# Patient Record
Sex: FEMALE | Race: WHITE | NOT HISPANIC OR LATINO | Employment: FULL TIME | URBAN - METROPOLITAN AREA
[De-identification: names, ages, dates, MRNs, and addresses within clinical notes are randomized per-mention and may not be internally consistent; named-entity substitution may affect disease eponyms.]

---

## 2017-05-08 ENCOUNTER — TRANSFERRED RECORDS (OUTPATIENT)
Dept: FAMILY MEDICINE | Facility: CLINIC | Age: 27
End: 2017-05-08

## 2017-10-29 ENCOUNTER — HEALTH MAINTENANCE LETTER (OUTPATIENT)
Age: 27
End: 2017-10-29

## 2019-07-07 DIAGNOSIS — J45.20 MILD INTERMITTENT REACTIVE AIRWAY DISEASE WITHOUT COMPLICATION: Primary | ICD-10-CM

## 2019-07-07 RX ORDER — ALBUTEROL SULFATE 90 UG/1
2 AEROSOL, METERED RESPIRATORY (INHALATION) EVERY 6 HOURS
Qty: 3 INHALER | Refills: 3 | Status: SHIPPED | OUTPATIENT
Start: 2019-07-07 | End: 2020-08-11

## 2020-05-12 DIAGNOSIS — J45.20 MILD INTERMITTENT REACTIVE AIRWAY DISEASE WITHOUT COMPLICATION: ICD-10-CM

## 2020-08-11 DIAGNOSIS — J45.20 MILD INTERMITTENT REACTIVE AIRWAY DISEASE WITHOUT COMPLICATION: ICD-10-CM

## 2020-08-11 RX ORDER — ALBUTEROL SULFATE 90 UG/1
2 AEROSOL, METERED RESPIRATORY (INHALATION) EVERY 6 HOURS
Qty: 3 INHALER | Refills: 3 | Status: SHIPPED | OUTPATIENT
Start: 2020-08-11 | End: 2024-02-05

## 2022-07-12 ENCOUNTER — HOSPITAL ENCOUNTER (OUTPATIENT)
Facility: CLINIC | Age: 32
End: 2022-07-12
Payer: COMMERCIAL

## 2022-08-18 ENCOUNTER — LAB (OUTPATIENT)
Dept: LAB | Facility: CLINIC | Age: 32
End: 2022-08-18
Payer: COMMERCIAL

## 2022-08-18 DIAGNOSIS — Z20.822 ENCOUNTER FOR LABORATORY TESTING FOR COVID-19 VIRUS: ICD-10-CM

## 2022-08-18 LAB — SARS-COV-2 RNA RESP QL NAA+PROBE: NEGATIVE

## 2022-08-18 PROCEDURE — U0003 INFECTIOUS AGENT DETECTION BY NUCLEIC ACID (DNA OR RNA); SEVERE ACUTE RESPIRATORY SYNDROME CORONAVIRUS 2 (SARS-COV-2) (CORONAVIRUS DISEASE [COVID-19]), AMPLIFIED PROBE TECHNIQUE, MAKING USE OF HIGH THROUGHPUT TECHNOLOGIES AS DESCRIBED BY CMS-2020-01-R: HCPCS

## 2022-08-18 PROCEDURE — U0005 INFEC AGEN DETEC AMPLI PROBE: HCPCS

## 2022-09-28 ENCOUNTER — OFFICE VISIT (OUTPATIENT)
Dept: FAMILY MEDICINE | Facility: CLINIC | Age: 32
End: 2022-09-28

## 2022-09-28 VITALS
TEMPERATURE: 97.8 F | DIASTOLIC BLOOD PRESSURE: 72 MMHG | WEIGHT: 122.6 LBS | HEIGHT: 61 IN | BODY MASS INDEX: 23.15 KG/M2 | RESPIRATION RATE: 16 BRPM | HEART RATE: 98 BPM | SYSTOLIC BLOOD PRESSURE: 130 MMHG | OXYGEN SATURATION: 97 %

## 2022-09-28 DIAGNOSIS — J45.20 MILD INTERMITTENT ASTHMA WITHOUT COMPLICATION: ICD-10-CM

## 2022-09-28 DIAGNOSIS — R21 RASH AND NONSPECIFIC SKIN ERUPTION: Primary | ICD-10-CM

## 2022-09-28 PROCEDURE — 99213 OFFICE O/P EST LOW 20 MIN: CPT | Performed by: FAMILY MEDICINE

## 2022-09-28 RX ORDER — IBUPROFEN 200 MG
600 TABLET ORAL
COMMUNITY
Start: 2022-08-22

## 2022-09-28 RX ORDER — SENNOSIDES A AND B 8.6 MG/1
8.6-17.2 TABLET, FILM COATED ORAL
COMMUNITY
Start: 2022-08-22 | End: 2022-09-28

## 2022-09-28 RX ORDER — OXYCODONE HYDROCHLORIDE 5 MG/1
5-10 TABLET ORAL
COMMUNITY
Start: 2022-08-22 | End: 2022-09-28

## 2022-09-28 RX ORDER — POLYETHYLENE GLYCOL 3350 17 G/17G
17 POWDER, FOR SOLUTION ORAL
COMMUNITY
Start: 2022-08-22 | End: 2022-09-28

## 2022-09-28 RX ORDER — FERROUS GLUCONATE 324(38)MG
324 TABLET ORAL
COMMUNITY
Start: 2022-08-22 | End: 2022-09-28

## 2022-09-28 RX ORDER — PREDNISONE 20 MG/1
40 TABLET ORAL DAILY
Qty: 8 TABLET | Refills: 0 | Status: SHIPPED | OUTPATIENT
Start: 2022-09-28 | End: 2022-10-02

## 2022-09-28 RX ORDER — PREDNISONE 20 MG/1
40 TABLET ORAL DAILY
Qty: 8 TABLET | Refills: 0 | Status: SHIPPED | OUTPATIENT
Start: 2022-09-28 | End: 2022-09-28

## 2022-09-28 RX ORDER — ACETAMINOPHEN 325 MG/1
325-650 TABLET ORAL
COMMUNITY
Start: 2022-08-22

## 2022-09-28 NOTE — PROGRESS NOTES
"SUBJECTIVE:    Deepa Osuna, is a 32 year old female, 5 weeks post partum with h/o asthma presenting for the below:     1. 2 day Hx of intensely pruritic rash. Initially to flexural surface of hands bilaterally. Spread to involve arms, legs and torso over the next 24 hours. Rash spares face and is present, but less florid to palms and soles. No lesions in mouth or lips. Mild cough last week requiring increased use of albuterol inhaler.  5 week old infant with mild cough also (currently breast feeding). Otherwise all in household are well.     Denies associated lip swelling, nausea, fevers, sore throat, wheeze and feels constitutionally well otherwise. Applying 1% hydrocortisone with minimal effect.     Denies any rash during pregnancy. No recent new cosmetic products. Is using new washing detergent, but formulated for sensitive skin. No new medications, supplements.      OBJECTIVE:  Vitals:    09/28/22 1530   BP: 130/72   Pulse: 98   Resp: 16   Temp: 97.8  F (36.6  C)   SpO2: 97%   Weight: 55.6 kg (122 lb 9.6 oz)   Height: 1.549 m (5' 1\")    Body mass index is 23.17 kg/m .    General: no acute distress, cooperative with exam.  Lungs: clear to auscultation bilaterally, normal respiratory effort.  Heart: regular rate, normal S1 and S2, no murmurs.   Skin: florid blanching maculopapular rash to all 4 limbs, flexural and extensor surfaces. Sandpaper like texture. Not affecting web spaces. Not centralized in stria or around waist. Not affecting lips or buccal mucosa. No vesicular lesions. Not confined to sun exposed areas.                  ASSESSMENT / PLAN:        Rash and nonspecific skin eruption  Mild intermittent asthma without complication  5 week post partum lactating mother with 2 days history of sudden onset intensely itchy maculopapular florid rash with sandpaper like quality. Differential includes eczema / atopic dermatitis eruption vs contact dermatitis. Less likely viral exanthem. Due to duration from " delivery, unlikely to be pregnancy related rash.   - short course of prednisone (evidence of this occurring in breast milk : she will give EBM over this time from a stored supply she has created)  -application of benadryl cream  -discussed antihistamines can cause reduced milk supply.     Issued on My Chart:    -if you are not getting any relief with the oral prednisone and benadryl cream we could add in a steroid ointment to apply to the skin in the most irritated areas: let me know in a few days how things are going.      -the dermatologist also advised daily bathing with a vanicream bar soap and luke warm water, pat dry after bathing and apply a non scented moisturizing cream (Eucerin is a good make).      -if this is not improving in 3-4 days, we should consider doing a skin biospy (taking a small pinch of skin for the pathologist to look at under the microscope to help focus down what is causing it).      -     predniSONE (DELTASONE) 20 MG tablet; Take 2 tablets (40 mg) by mouth daily for 4 days

## 2023-01-14 ENCOUNTER — HEALTH MAINTENANCE LETTER (OUTPATIENT)
Age: 33
End: 2023-01-14

## 2023-02-05 DIAGNOSIS — J45.20 MILD INTERMITTENT ASTHMA WITHOUT COMPLICATION: Primary | ICD-10-CM

## 2023-02-05 RX ORDER — FLUTICASONE PROPIONATE AND SALMETEROL 113; 14 UG/1; UG/1
1 POWDER, METERED RESPIRATORY (INHALATION) 2 TIMES DAILY
Qty: 3 EACH | Refills: 3 | Status: SHIPPED | OUTPATIENT
Start: 2023-02-05 | End: 2023-02-05

## 2023-02-05 RX ORDER — FLUTICASONE PROPIONATE AND SALMETEROL 113; 14 UG/1; UG/1
POWDER, METERED RESPIRATORY (INHALATION)
Qty: 3 EACH | Refills: 3 | Status: SHIPPED | OUTPATIENT
Start: 2023-02-05

## 2023-02-05 RX ORDER — FLUTICASONE PROPIONATE AND SALMETEROL 113; 14 UG/1; UG/1
1 POWDER, METERED RESPIRATORY (INHALATION) 2 TIMES DAILY
Qty: 3 EACH | Refills: 3 | Status: SHIPPED | OUTPATIENT
Start: 2023-02-05

## 2024-02-05 DIAGNOSIS — J45.20 MILD INTERMITTENT REACTIVE AIRWAY DISEASE WITHOUT COMPLICATION: ICD-10-CM

## 2024-02-05 RX ORDER — ALBUTEROL SULFATE 90 UG/1
2 AEROSOL, METERED RESPIRATORY (INHALATION) EVERY 6 HOURS
Qty: 18 G | Refills: 3 | Status: SHIPPED | OUTPATIENT
Start: 2024-02-05

## 2024-02-17 ENCOUNTER — HEALTH MAINTENANCE LETTER (OUTPATIENT)
Age: 34
End: 2024-02-17

## 2024-02-19 DIAGNOSIS — H10.31 ACUTE BACTERIAL CONJUNCTIVITIS OF RIGHT EYE: Primary | ICD-10-CM

## 2024-02-19 RX ORDER — OFLOXACIN 3 MG/ML
1-2 SOLUTION/ DROPS OPHTHALMIC 4 TIMES DAILY
Qty: 5 ML | Refills: 1 | Status: SHIPPED | OUTPATIENT
Start: 2024-02-19

## 2024-02-25 DIAGNOSIS — J45.20 MILD INTERMITTENT ASTHMA WITHOUT COMPLICATION: Primary | ICD-10-CM

## 2024-02-25 RX ORDER — FLUTICASONE PROPIONATE AND SALMETEROL 113; 14 UG/1; UG/1
1 POWDER, METERED RESPIRATORY (INHALATION) 2 TIMES DAILY
Qty: 3 EACH | Refills: 3 | Status: SHIPPED | OUTPATIENT
Start: 2024-02-25

## 2024-09-24 LAB — GROUP B STREPTOCOCCUS (EXTERNAL): NEGATIVE

## 2024-10-14 ENCOUNTER — LAB (OUTPATIENT)
Dept: LAB | Facility: CLINIC | Age: 34
End: 2024-10-14
Payer: COMMERCIAL

## 2024-10-14 LAB
ABO/RH(D): NORMAL
ANTIBODY SCREEN: NEGATIVE
HGB BLD-MCNC: 11.1 G/DL (ref 11.7–15.7)
SPECIMEN EXPIRATION DATE: NORMAL
T PALLIDUM AB SER QL: NONREACTIVE

## 2024-10-14 PROCEDURE — 86780 TREPONEMA PALLIDUM: CPT | Performed by: OBSTETRICS & GYNECOLOGY

## 2024-10-14 PROCEDURE — 36415 COLL VENOUS BLD VENIPUNCTURE: CPT | Performed by: OBSTETRICS & GYNECOLOGY

## 2024-10-14 PROCEDURE — 86901 BLOOD TYPING SEROLOGIC RH(D): CPT | Performed by: OBSTETRICS & GYNECOLOGY

## 2024-10-14 PROCEDURE — 86900 BLOOD TYPING SEROLOGIC ABO: CPT | Performed by: OBSTETRICS & GYNECOLOGY

## 2024-10-14 PROCEDURE — 85018 HEMOGLOBIN: CPT | Performed by: OBSTETRICS & GYNECOLOGY

## 2024-10-15 ENCOUNTER — HOSPITAL ENCOUNTER (INPATIENT)
Facility: CLINIC | Age: 34
LOS: 2 days | Discharge: HOME HEALTH | End: 2024-10-17
Attending: OBSTETRICS & GYNECOLOGY | Admitting: OBSTETRICS & GYNECOLOGY
Payer: COMMERCIAL

## 2024-10-15 ENCOUNTER — ANESTHESIA (OUTPATIENT)
Dept: OBGYN | Facility: CLINIC | Age: 34
End: 2024-10-15
Payer: COMMERCIAL

## 2024-10-15 ENCOUNTER — ANESTHESIA EVENT (OUTPATIENT)
Dept: OBGYN | Facility: CLINIC | Age: 34
End: 2024-10-15
Payer: COMMERCIAL

## 2024-10-15 DIAGNOSIS — O34.219 PREVIOUS CESAREAN DELIVERY, ANTEPARTUM CONDITION OR COMPLICATION: ICD-10-CM

## 2024-10-15 PROBLEM — Z30.2 ENCOUNTER FOR STERILIZATION: Status: ACTIVE | Noted: 2024-10-15

## 2024-10-15 PROCEDURE — 258N000003 HC RX IP 258 OP 636: Performed by: OBSTETRICS & GYNECOLOGY

## 2024-10-15 PROCEDURE — 272N000001 HC OR GENERAL SUPPLY STERILE: Performed by: OBSTETRICS & GYNECOLOGY

## 2024-10-15 PROCEDURE — 999N000016 HC STATISTIC ATTENDANCE AT DELIVERY

## 2024-10-15 PROCEDURE — 710N000010 HC RECOVERY PHASE 1, LEVEL 2, PER MIN: Performed by: OBSTETRICS & GYNECOLOGY

## 2024-10-15 PROCEDURE — 250N000013 HC RX MED GY IP 250 OP 250 PS 637: Performed by: OBSTETRICS & GYNECOLOGY

## 2024-10-15 PROCEDURE — 250N000009 HC RX 250: Performed by: NURSE ANESTHETIST, CERTIFIED REGISTERED

## 2024-10-15 PROCEDURE — 250N000011 HC RX IP 250 OP 636: Performed by: NURSE ANESTHETIST, CERTIFIED REGISTERED

## 2024-10-15 PROCEDURE — 370N000017 HC ANESTHESIA TECHNICAL FEE, PER MIN: Performed by: OBSTETRICS & GYNECOLOGY

## 2024-10-15 PROCEDURE — 250N000011 HC RX IP 250 OP 636: Performed by: OBSTETRICS & GYNECOLOGY

## 2024-10-15 PROCEDURE — 88302 TISSUE EXAM BY PATHOLOGIST: CPT | Mod: TC | Performed by: OBSTETRICS & GYNECOLOGY

## 2024-10-15 PROCEDURE — 59510 CESAREAN DELIVERY: CPT | Performed by: ANESTHESIOLOGY

## 2024-10-15 PROCEDURE — 258N000003 HC RX IP 258 OP 636: Performed by: NURSE ANESTHETIST, CERTIFIED REGISTERED

## 2024-10-15 PROCEDURE — 360N000076 HC SURGERY LEVEL 3, PER MIN: Performed by: OBSTETRICS & GYNECOLOGY

## 2024-10-15 PROCEDURE — 250N000011 HC RX IP 250 OP 636: Mod: JW | Performed by: ANESTHESIOLOGY

## 2024-10-15 PROCEDURE — 120N000012 HC R&B POSTPARTUM

## 2024-10-15 PROCEDURE — 0UT70ZZ RESECTION OF BILATERAL FALLOPIAN TUBES, OPEN APPROACH: ICD-10-PCS | Performed by: OBSTETRICS & GYNECOLOGY

## 2024-10-15 PROCEDURE — 59510 CESAREAN DELIVERY: CPT | Performed by: NURSE ANESTHETIST, CERTIFIED REGISTERED

## 2024-10-15 RX ORDER — PROCHLORPERAZINE MALEATE 10 MG
10 TABLET ORAL EVERY 6 HOURS PRN
Status: DISCONTINUED | OUTPATIENT
Start: 2024-10-15 | End: 2024-10-17 | Stop reason: HOSPADM

## 2024-10-15 RX ORDER — HYDROMORPHONE HYDROCHLORIDE 1 MG/ML
.3-.5 INJECTION, SOLUTION INTRAMUSCULAR; INTRAVENOUS; SUBCUTANEOUS
Status: DISCONTINUED | OUTPATIENT
Start: 2024-10-15 | End: 2024-10-17 | Stop reason: HOSPADM

## 2024-10-15 RX ORDER — CEFAZOLIN SODIUM/WATER 2 G/20 ML
2 SYRINGE (ML) INTRAVENOUS SEE ADMIN INSTRUCTIONS
Status: DISCONTINUED | OUTPATIENT
Start: 2024-10-15 | End: 2024-10-15 | Stop reason: HOSPADM

## 2024-10-15 RX ORDER — ACETAMINOPHEN 325 MG/1
975 TABLET ORAL EVERY 6 HOURS
Status: DISCONTINUED | OUTPATIENT
Start: 2024-10-15 | End: 2024-10-17 | Stop reason: HOSPADM

## 2024-10-15 RX ORDER — IBUPROFEN 400 MG/1
800 TABLET, FILM COATED ORAL EVERY 6 HOURS
Status: DISCONTINUED | OUTPATIENT
Start: 2024-10-16 | End: 2024-10-17 | Stop reason: HOSPADM

## 2024-10-15 RX ORDER — SIMETHICONE 80 MG
80 TABLET,CHEWABLE ORAL 4 TIMES DAILY PRN
Status: DISCONTINUED | OUTPATIENT
Start: 2024-10-15 | End: 2024-10-17 | Stop reason: HOSPADM

## 2024-10-15 RX ORDER — NALOXONE HYDROCHLORIDE 0.4 MG/ML
0.2 INJECTION, SOLUTION INTRAMUSCULAR; INTRAVENOUS; SUBCUTANEOUS
Status: DISCONTINUED | OUTPATIENT
Start: 2024-10-15 | End: 2024-10-17 | Stop reason: HOSPADM

## 2024-10-15 RX ORDER — FLUTICASONE PROPIONATE AND SALMETEROL 113; 14 UG/1; UG/1
1 POWDER, METERED RESPIRATORY (INHALATION) 2 TIMES DAILY
Status: DISCONTINUED | OUTPATIENT
Start: 2024-10-15 | End: 2024-10-17 | Stop reason: HOSPADM

## 2024-10-15 RX ORDER — ONDANSETRON 2 MG/ML
4 INJECTION INTRAMUSCULAR; INTRAVENOUS EVERY 6 HOURS PRN
Status: DISCONTINUED | OUTPATIENT
Start: 2024-10-15 | End: 2024-10-17 | Stop reason: HOSPADM

## 2024-10-15 RX ORDER — KETOROLAC TROMETHAMINE 30 MG/ML
30 INJECTION, SOLUTION INTRAMUSCULAR; INTRAVENOUS EVERY 6 HOURS
Status: COMPLETED | OUTPATIENT
Start: 2024-10-15 | End: 2024-10-16

## 2024-10-15 RX ORDER — SODIUM PHOSPHATE,MONO-DIBASIC 19G-7G/118
1 ENEMA (ML) RECTAL DAILY PRN
Status: DISCONTINUED | OUTPATIENT
Start: 2024-10-17 | End: 2024-10-17 | Stop reason: HOSPADM

## 2024-10-15 RX ORDER — OXYTOCIN 10 [USP'U]/ML
10 INJECTION, SOLUTION INTRAMUSCULAR; INTRAVENOUS
Status: DISCONTINUED | OUTPATIENT
Start: 2024-10-15 | End: 2024-10-15 | Stop reason: HOSPADM

## 2024-10-15 RX ORDER — SODIUM CHLORIDE, SODIUM LACTATE, POTASSIUM CHLORIDE, CALCIUM CHLORIDE 600; 310; 30; 20 MG/100ML; MG/100ML; MG/100ML; MG/100ML
INJECTION, SOLUTION INTRAVENOUS CONTINUOUS
Status: DISCONTINUED | OUTPATIENT
Start: 2024-10-15 | End: 2024-10-15

## 2024-10-15 RX ORDER — METOCLOPRAMIDE HYDROCHLORIDE 5 MG/ML
10 INJECTION INTRAMUSCULAR; INTRAVENOUS EVERY 6 HOURS PRN
Status: DISCONTINUED | OUTPATIENT
Start: 2024-10-15 | End: 2024-10-17 | Stop reason: HOSPADM

## 2024-10-15 RX ORDER — HYDROCORTISONE 25 MG/G
CREAM TOPICAL 3 TIMES DAILY PRN
Status: DISCONTINUED | OUTPATIENT
Start: 2024-10-15 | End: 2024-10-17 | Stop reason: HOSPADM

## 2024-10-15 RX ORDER — ONDANSETRON 2 MG/ML
INJECTION INTRAMUSCULAR; INTRAVENOUS PRN
Status: DISCONTINUED | OUTPATIENT
Start: 2024-10-15 | End: 2024-10-15

## 2024-10-15 RX ORDER — CARBOPROST TROMETHAMINE 250 UG/ML
250 INJECTION, SOLUTION INTRAMUSCULAR
Status: DISCONTINUED | OUTPATIENT
Start: 2024-10-15 | End: 2024-10-15 | Stop reason: HOSPADM

## 2024-10-15 RX ORDER — OXYTOCIN 10 [USP'U]/ML
10 INJECTION, SOLUTION INTRAMUSCULAR; INTRAVENOUS
Status: DISCONTINUED | OUTPATIENT
Start: 2024-10-15 | End: 2024-10-15

## 2024-10-15 RX ORDER — NALOXONE HYDROCHLORIDE 0.4 MG/ML
0.4 INJECTION, SOLUTION INTRAMUSCULAR; INTRAVENOUS; SUBCUTANEOUS
Status: DISCONTINUED | OUTPATIENT
Start: 2024-10-15 | End: 2024-10-17 | Stop reason: HOSPADM

## 2024-10-15 RX ORDER — LIDOCAINE 40 MG/G
CREAM TOPICAL
Status: DISCONTINUED | OUTPATIENT
Start: 2024-10-15 | End: 2024-10-17 | Stop reason: HOSPADM

## 2024-10-15 RX ORDER — LOPERAMIDE HYDROCHLORIDE 2 MG/1
4 CAPSULE ORAL
Status: DISCONTINUED | OUTPATIENT
Start: 2024-10-15 | End: 2024-10-17 | Stop reason: HOSPADM

## 2024-10-15 RX ORDER — METOCLOPRAMIDE 10 MG/1
10 TABLET ORAL EVERY 6 HOURS PRN
Status: DISCONTINUED | OUTPATIENT
Start: 2024-10-15 | End: 2024-10-17 | Stop reason: HOSPADM

## 2024-10-15 RX ORDER — MISOPROSTOL 200 UG/1
400 TABLET ORAL
Status: DISCONTINUED | OUTPATIENT
Start: 2024-10-15 | End: 2024-10-17 | Stop reason: HOSPADM

## 2024-10-15 RX ORDER — ONDANSETRON 4 MG/1
4 TABLET, ORALLY DISINTEGRATING ORAL EVERY 6 HOURS PRN
Status: DISCONTINUED | OUTPATIENT
Start: 2024-10-15 | End: 2024-10-17 | Stop reason: HOSPADM

## 2024-10-15 RX ORDER — TRANEXAMIC ACID 10 MG/ML
1 INJECTION, SOLUTION INTRAVENOUS EVERY 30 MIN PRN
Status: DISCONTINUED | OUTPATIENT
Start: 2024-10-15 | End: 2024-10-15 | Stop reason: HOSPADM

## 2024-10-15 RX ORDER — OXYTOCIN/0.9 % SODIUM CHLORIDE 30/500 ML
100-340 PLASTIC BAG, INJECTION (ML) INTRAVENOUS CONTINUOUS PRN
Status: DISCONTINUED | OUTPATIENT
Start: 2024-10-15 | End: 2024-10-15

## 2024-10-15 RX ORDER — DEXTROSE, SODIUM CHLORIDE, SODIUM LACTATE, POTASSIUM CHLORIDE, AND CALCIUM CHLORIDE 5; .6; .31; .03; .02 G/100ML; G/100ML; G/100ML; G/100ML; G/100ML
INJECTION, SOLUTION INTRAVENOUS CONTINUOUS
Status: DISCONTINUED | OUTPATIENT
Start: 2024-10-15 | End: 2024-10-17 | Stop reason: HOSPADM

## 2024-10-15 RX ORDER — BUPIVACAINE HYDROCHLORIDE 7.5 MG/ML
INJECTION, SOLUTION INTRASPINAL
Status: COMPLETED | OUTPATIENT
Start: 2024-10-15 | End: 2024-10-15

## 2024-10-15 RX ORDER — AMOXICILLIN 250 MG
2 CAPSULE ORAL 2 TIMES DAILY
Status: DISCONTINUED | OUTPATIENT
Start: 2024-10-15 | End: 2024-10-17 | Stop reason: HOSPADM

## 2024-10-15 RX ORDER — LOPERAMIDE HYDROCHLORIDE 2 MG/1
2 CAPSULE ORAL
Status: DISCONTINUED | OUTPATIENT
Start: 2024-10-15 | End: 2024-10-17 | Stop reason: HOSPADM

## 2024-10-15 RX ORDER — CEFAZOLIN SODIUM/WATER 2 G/20 ML
SYRINGE (ML) INTRAVENOUS
Status: DISCONTINUED
Start: 2024-10-15 | End: 2024-10-15 | Stop reason: HOSPADM

## 2024-10-15 RX ORDER — BISACODYL 10 MG
10 SUPPOSITORY, RECTAL RECTAL DAILY PRN
Status: DISCONTINUED | OUTPATIENT
Start: 2024-10-17 | End: 2024-10-17 | Stop reason: HOSPADM

## 2024-10-15 RX ORDER — OXYTOCIN 10 [USP'U]/ML
10 INJECTION, SOLUTION INTRAMUSCULAR; INTRAVENOUS
Status: DISCONTINUED | OUTPATIENT
Start: 2024-10-15 | End: 2024-10-17 | Stop reason: HOSPADM

## 2024-10-15 RX ORDER — OXYTOCIN/0.9 % SODIUM CHLORIDE 30/500 ML
PLASTIC BAG, INJECTION (ML) INTRAVENOUS CONTINUOUS PRN
Status: DISCONTINUED | OUTPATIENT
Start: 2024-10-15 | End: 2024-10-15

## 2024-10-15 RX ORDER — CEFAZOLIN SODIUM/WATER 2 G/20 ML
2 SYRINGE (ML) INTRAVENOUS
Status: COMPLETED | OUTPATIENT
Start: 2024-10-15 | End: 2024-10-15

## 2024-10-15 RX ORDER — TRANEXAMIC ACID 10 MG/ML
1 INJECTION, SOLUTION INTRAVENOUS EVERY 30 MIN PRN
Status: DISCONTINUED | OUTPATIENT
Start: 2024-10-15 | End: 2024-10-17 | Stop reason: HOSPADM

## 2024-10-15 RX ORDER — LOPERAMIDE HYDROCHLORIDE 2 MG/1
2 CAPSULE ORAL
Status: DISCONTINUED | OUTPATIENT
Start: 2024-10-15 | End: 2024-10-15 | Stop reason: HOSPADM

## 2024-10-15 RX ORDER — CITRIC ACID/SODIUM CITRATE 334-500MG
30 SOLUTION, ORAL ORAL
Status: COMPLETED | OUTPATIENT
Start: 2024-10-15 | End: 2024-10-15

## 2024-10-15 RX ORDER — CARBOPROST TROMETHAMINE 250 UG/ML
250 INJECTION, SOLUTION INTRAMUSCULAR
Status: DISCONTINUED | OUTPATIENT
Start: 2024-10-15 | End: 2024-10-17 | Stop reason: HOSPADM

## 2024-10-15 RX ORDER — MISOPROSTOL 200 UG/1
800 TABLET ORAL
Status: DISCONTINUED | OUTPATIENT
Start: 2024-10-15 | End: 2024-10-17 | Stop reason: HOSPADM

## 2024-10-15 RX ORDER — MISOPROSTOL 200 UG/1
400 TABLET ORAL
Status: DISCONTINUED | OUTPATIENT
Start: 2024-10-15 | End: 2024-10-15 | Stop reason: HOSPADM

## 2024-10-15 RX ORDER — METHYLERGONOVINE MALEATE 0.2 MG/ML
200 INJECTION INTRAVENOUS
Status: DISCONTINUED | OUTPATIENT
Start: 2024-10-15 | End: 2024-10-15 | Stop reason: HOSPADM

## 2024-10-15 RX ORDER — ACETAMINOPHEN 325 MG/1
975 TABLET ORAL ONCE
Status: COMPLETED | OUTPATIENT
Start: 2024-10-15 | End: 2024-10-15

## 2024-10-15 RX ORDER — OXYTOCIN/0.9 % SODIUM CHLORIDE 30/500 ML
340 PLASTIC BAG, INJECTION (ML) INTRAVENOUS CONTINUOUS PRN
Status: DISCONTINUED | OUTPATIENT
Start: 2024-10-15 | End: 2024-10-17 | Stop reason: HOSPADM

## 2024-10-15 RX ORDER — LIDOCAINE 40 MG/G
CREAM TOPICAL
Status: DISCONTINUED | OUTPATIENT
Start: 2024-10-15 | End: 2024-10-15 | Stop reason: HOSPADM

## 2024-10-15 RX ORDER — MODIFIED LANOLIN
OINTMENT (GRAM) TOPICAL
Status: DISCONTINUED | OUTPATIENT
Start: 2024-10-15 | End: 2024-10-17 | Stop reason: HOSPADM

## 2024-10-15 RX ORDER — METHYLERGONOVINE MALEATE 0.2 MG/ML
200 INJECTION INTRAVENOUS
Status: DISCONTINUED | OUTPATIENT
Start: 2024-10-15 | End: 2024-10-17 | Stop reason: HOSPADM

## 2024-10-15 RX ORDER — OXYCODONE HYDROCHLORIDE 5 MG/1
5 TABLET ORAL EVERY 4 HOURS PRN
Status: DISCONTINUED | OUTPATIENT
Start: 2024-10-15 | End: 2024-10-17 | Stop reason: HOSPADM

## 2024-10-15 RX ORDER — LOPERAMIDE HYDROCHLORIDE 2 MG/1
4 CAPSULE ORAL
Status: DISCONTINUED | OUTPATIENT
Start: 2024-10-15 | End: 2024-10-15 | Stop reason: HOSPADM

## 2024-10-15 RX ORDER — MISOPROSTOL 200 UG/1
800 TABLET ORAL
Status: DISCONTINUED | OUTPATIENT
Start: 2024-10-15 | End: 2024-10-15 | Stop reason: HOSPADM

## 2024-10-15 RX ORDER — MORPHINE SULFATE 1 MG/ML
INJECTION, SOLUTION EPIDURAL; INTRATHECAL; INTRAVENOUS
Status: COMPLETED | OUTPATIENT
Start: 2024-10-15 | End: 2024-10-15

## 2024-10-15 RX ORDER — AMOXICILLIN 250 MG
1 CAPSULE ORAL 2 TIMES DAILY
Status: DISCONTINUED | OUTPATIENT
Start: 2024-10-15 | End: 2024-10-17 | Stop reason: HOSPADM

## 2024-10-15 RX ORDER — OXYTOCIN/0.9 % SODIUM CHLORIDE 30/500 ML
340 PLASTIC BAG, INJECTION (ML) INTRAVENOUS CONTINUOUS PRN
Status: DISCONTINUED | OUTPATIENT
Start: 2024-10-15 | End: 2024-10-15 | Stop reason: HOSPADM

## 2024-10-15 RX ADMIN — Medication 340 ML/HR: at 13:58

## 2024-10-15 RX ADMIN — ACETAMINOPHEN 975 MG: 325 TABLET ORAL at 11:56

## 2024-10-15 RX ADMIN — SODIUM CHLORIDE, POTASSIUM CHLORIDE, SODIUM LACTATE AND CALCIUM CHLORIDE 500 ML: 600; 310; 30; 20 INJECTION, SOLUTION INTRAVENOUS at 11:34

## 2024-10-15 RX ADMIN — SODIUM CHLORIDE, SODIUM LACTATE, POTASSIUM CHLORIDE, CALCIUM CHLORIDE AND DEXTROSE MONOHYDRATE 1000 ML: 5; 600; 310; 30; 20 INJECTION, SOLUTION INTRAVENOUS at 22:18

## 2024-10-15 RX ADMIN — KETOROLAC TROMETHAMINE 30 MG: 30 INJECTION, SOLUTION INTRAMUSCULAR at 23:21

## 2024-10-15 RX ADMIN — KETOROLAC TROMETHAMINE 30 MG: 30 INJECTION, SOLUTION INTRAMUSCULAR at 17:08

## 2024-10-15 RX ADMIN — FLUTICASONE PROPIONATE AND SALMETEROL 1 PUFF: 113; 14 POWDER, METERED RESPIRATORY (INHALATION) at 20:50

## 2024-10-15 RX ADMIN — ONDANSETRON 4 MG: 2 INJECTION INTRAMUSCULAR; INTRAVENOUS at 19:56

## 2024-10-15 RX ADMIN — Medication 2 G: at 13:15

## 2024-10-15 RX ADMIN — METOCLOPRAMIDE 10 MG: 5 INJECTION, SOLUTION INTRAMUSCULAR; INTRAVENOUS at 18:11

## 2024-10-15 RX ADMIN — ONDANSETRON 4 MG: 2 INJECTION INTRAMUSCULAR; INTRAVENOUS at 13:15

## 2024-10-15 RX ADMIN — SODIUM CITRATE AND CITRIC ACID MONOHYDRATE 30 ML: 500; 334 SOLUTION ORAL at 12:51

## 2024-10-15 RX ADMIN — PHENYLEPHRINE HYDROCHLORIDE 0.5 MCG/KG/MIN: 10 INJECTION INTRAVENOUS at 13:24

## 2024-10-15 RX ADMIN — BUPIVACAINE HYDROCHLORIDE IN DEXTROSE 1.6 ML: 7.5 INJECTION, SOLUTION SUBARACHNOID at 13:25

## 2024-10-15 RX ADMIN — SODIUM CHLORIDE, POTASSIUM CHLORIDE, SODIUM LACTATE AND CALCIUM CHLORIDE: 600; 310; 30; 20 INJECTION, SOLUTION INTRAVENOUS at 14:01

## 2024-10-15 RX ADMIN — MORPHINE SULFATE 0.2 MG: 1 INJECTION, SOLUTION EPIDURAL; INTRATHECAL; INTRAVENOUS at 13:25

## 2024-10-15 RX ADMIN — SODIUM CHLORIDE, POTASSIUM CHLORIDE, SODIUM LACTATE AND CALCIUM CHLORIDE 1000 ML: 600; 310; 30; 20 INJECTION, SOLUTION INTRAVENOUS at 20:50

## 2024-10-15 ASSESSMENT — ACTIVITIES OF DAILY LIVING (ADL)
ADLS_ACUITY_SCORE: 22
ADLS_ACUITY_SCORE: 20
ADLS_ACUITY_SCORE: 33
ADLS_ACUITY_SCORE: 22
ADLS_ACUITY_SCORE: 22
ADLS_ACUITY_SCORE: 20
ADLS_ACUITY_SCORE: 22
ADLS_ACUITY_SCORE: 22

## 2024-10-15 NOTE — PLAN OF CARE
Data: Rosalina Osuna transferred to 421 via wheelchair at 1605. Baby transferred via parent's arms.  Action: Receiving unit notified of transfer: Yes. Patient and family notified of room change. Report given to Kaci ERVIN at 1610. Belongings sent to receiving unit. Accompanied by Registered Nurse. Oriented patient to surroundings. Call light within reach. ID bands double-checked with receiving RN. Vergas fundal check.  Response: Patient tolerated transfer and is stable.    Saeed Jeronimo RN on 10/15/2024 at 4:24 PM

## 2024-10-15 NOTE — PLAN OF CARE
Goal Outcome Evaluation:      Plan of Care Reviewed With: patient, spouse    Overall Patient Progress: improvingOverall Patient Progress: improving       Vital signs stable. Postpartum assessment WDL. Incision dressing CDI. Pain controlled with tylenol and toradol. Breastfeeding on cue with minimal assist. Patient and infant bonding well. Weiss patent and clear urine. Pt N/V, effective response to reglan given.

## 2024-10-15 NOTE — L&D DELIVERY NOTE
POST OPERATIVE NOTE   SECTION        Pre-Op Diagnosis: 39 weeks,  previous CS, multiparous desires permanent sterilization    Post-Op Diagnosis: same    Procedure: repeat , bilateral salpingectomy    Surgeon:  Arabella Nolan MD  Assist: DEAN Gonzalez    Anesthesia: spinal    Complications: none    EBL: 625 cc    Findings:  Viable female  infant  Weight 8#0  Apgars pending  Normal  uterus/tubes/ovaries         TECHNIQUE   The patient was taken to the operating room where a spinal was placed without difficulty. She was placed on the operating table in the left lateral tilt position and prepped and draped in the normal sterile fashion. The prior scar was removed with the scalpel.  A Pfannenstiel skin incision was created with the scalpel. This was carried down to the level of the fascia. The fascia was incised in the  midline and this was extended laterally with the Harden scissors. The fascia was dissected superiorly and inferiorly off the underlying rectus muscles. The rectus muscles were  in the midline and the underlying parietal peritoneum was identified and entered. This was extended superiorly and inferiorly with good visualization of the bladder and the bladder blade was inserted. The bladder flap was created with the Metzenbaum scissors. The bladder blade was reinserted.     The low transverse uterine incision was created.  The amniotic fluid was clear.  The infant was delivered without difficulty.  .  The mouth and nose were bulb suctioned. Nuchal cord  was not present.  The umbilical cord was clamped and cut after 60 seconds.  The infant was handed off to the waiting nursing staff. Cord blood was obtained. The placenta was removed with gentle traction.     The uterus was exteriorized  and cleared of all clots and debris. The uterine incision was closed in 2 layers using 0 PDS  suture. Hemostasis was noted.  Bilateral salpingectomy was performed with the Ligasure device.   The  uterus was returned to the abdomen.  Hemostasis was noted.  Surgicel powder was placed over the bladder flap site.  The abdomen was then closed in layers by first closing the fascia in a running fashion using looped 0 PDS suture. The subcutaneous space was irrigated and reapproximated using interrupted sutures  of 3-0 plain gut suture. The skin was closed with a running subcuticular suture of 4-0 monocryl.  The fundus was noted to be firm at the conclusion of the procedure.  The patient was taken to recovery in stable condition with no complications.     The infant required CPAP in the OR, was evaluated by the NICU team, and was transferred to the NICU for ongoing CPAP.      Arabella Nolan MD  DOS 10/15/24

## 2024-10-15 NOTE — PROGRESS NOTES
Pt. admitted from L&D. Pt. arrived with baby, was accompanied by  Jay Jay and arrived with personal belongings. Report was taken from RUY Tipton in L&D and NB bands verified. VS stable, Fundus is firm and midline.  C/S incision dressing CDI. Pt. oriented to the room, call light system and  safety. Call light in reach.

## 2024-10-15 NOTE — ANESTHESIA POSTPROCEDURE EVALUATION
Patient: Rosalina Osuna    Procedure: Procedure(s):  Repeat  section with bilateral salpingectomy       Anesthesia Type:  Epidural, Spinal    Note:  Disposition: Inpatient   Postop Pain Control: Uneventful            Sign Out: Well controlled pain   PONV: No   Neuro/Psych: Uneventful            Sign Out: Acceptable/Baseline neuro status   Airway/Respiratory: Uneventful            Sign Out: Acceptable/Baseline resp. status   CV/Hemodynamics: Uneventful            Sign Out: Acceptable CV status; No obvious hypovolemia; No obvious fluid overload   Other NRE:    DID A NON-ROUTINE EVENT OCCUR?            Last vitals:  Vitals Value Taken Time   /74 10/15/24 1515   Temp 36.3  C (97.4  F) 10/15/24 1500   Pulse 58 10/15/24 1523   Resp 14 10/15/24 1523   SpO2 98 % 10/15/24 1523   Vitals shown include unfiled device data.    Electronically Signed By: Marielena Tellez MD  October 15, 2024  3:25 PM

## 2024-10-15 NOTE — PLAN OF CARE
Data: Patient admitted to room 235 at 1050. Patient is a . Prenatal record reviewed.   OB History    Para Term  AB Living   2 1 1 0 0 1   SAB IAB Ectopic Multiple Live Births   0 0 0 0 1      # Outcome Date GA Lbr Paramjit/2nd Weight Sex Type Anes PTL Lv   2 Current            1 Term 2022 41w0d   M -SEC   DARRON   .  Medical History: prior  section, history of asthma.  Gestational age 39w0d. Vital signs per doc flowsheet. Fetal movement present. Patient reports Scheduled  Section  as reason for admission. Support persons Noah (Chip) present.  Action: Verbal consent for EFM, external fetal monitors applied. Admission assessment completed. Patient and support persons educated on labor process. Patient instructed to report change in fetal movement, contractions, vaginal leaking of fluid or bleeding, abdominal pain, or any concerns related to the pregnancy to her nurse/physician. Patient oriented to room, call light in reach.   Response: Dr. Nolan informed of patient arrival. Plan per provider is repeat  section. Patient verbalized understanding of education and verbalized agreement with plan.     Saeed Jeronimo RN on 10/15/2024 at 12:15 PM

## 2024-10-15 NOTE — ANESTHESIA PROCEDURE NOTES
"Intrathecal Procedure Note    Pre-Procedure   Staff -        Anesthesiologist:  Christiano Rosas MD       Performed By: anesthesiologist       Location: OR       Pre-Anesthestic Checklist: patient identified, IV checked, risks and benefits discussed, informed consent, monitors and equipment checked and pre-op evaluation  Timeout:       Correct Patient: Yes        Correct Procedure: Yes        Correct Site: Yes        Correct Position: Yes   Procedure Documentation  Procedure: intrathecal       Patient Position: sitting       Skin prep: Betadine       Insertion Site: L4-5. (midline approach).       Needle Gauge: 24.        Needle Length (Inches): 3.5        Spinal Needle Type: Pencan       Introducer used       Introducer: 20 G       # of attempts: 1 and  # of redirects:     Assessment/Narrative         Paresthesias: No.       CSF fluid: clear.    Medication(s) Administered   0.75% Hyperbaric Bupivacaine (Intrathecal) - Intrathecal   1.6 mL - 10/15/2024 1:25:00 PM  Morphine PF 1 mg/mL (Intrathecal) - Intrathecal   0.2 mg - 10/15/2024 1:25:00 PM   Comments:  1% lidocaine for localization.  No complications.        FOR [facility] ([facility]) ONLY:   Pain Team Contact information: please page the Pain Team Via [facility] [address]. Search \"Pain\". During daytime hours, please page the attending first. At night please page the resident first.      "

## 2024-10-15 NOTE — PROGRESS NOTES
I was asked to assist in this  Section to aid in a safe delivery for both mom and baby.  I aided with the transverse abdominal incision, incision of the fascia, and low transverse uterine incision. My help was required to offer fundal pressure for delivery of the baby. I then assisted with closure of the uterine defect, reapproximation of the fascia and skin closure.     Marianna Fleming PA-C on 10/15/2024 at 2:41 PM

## 2024-10-15 NOTE — ANESTHESIA CARE TRANSFER NOTE
Patient: Rosalina Osuna    Procedure: Procedure(s):  Repeat  section with bilateral salpingectomy       Diagnosis: Previous  section [Z98.891]  Admission for sterilization [Z30.2]  Diagnosis Additional Information: No value filed.    Anesthesia Type:   Epidural, Spinal     Note:    Oropharynx: oropharynx clear of all foreign objects  Level of Consciousness: awake  Oxygen Supplementation: room air    Independent Airway: airway patency satisfactory and stable  Dentition: dentition unchanged  Vital Signs Stable: post-procedure vital signs reviewed and stable  Report to RN Given: handoff report given  Patient transferred to: Labor and Delivery    Handoff Report: Identifed the Patient, Identified the Reponsible Provider, Reviewed the pertinent medical history, Discussed the surgical course, Reviewed Intra-OP anesthesia mangement and issues during anesthesia, Set expectations for post-procedure period and Allowed opportunity for questions and acknowledgement of understanding      Vitals:  Vitals Value Taken Time   /63    Temp 36    Pulse 63    Resp 14    SpO2 98        Electronically Signed By: RAMAKRISHNA Lawson CRNA  October 15, 2024  3:04 PM  
SURGEON:

Nanda Harley MD

 

DATE OF PROCEDURE:  04/23/2018

 

PREOPERATIVE DIAGNOSIS:

Left knee fat pad impingement.

 

POSTOPERATIVE DIAGNOSIS:

Left knee fat pad impingement.

 

PROCEDURE:

Left knee arthroscopy with limited synovectomy.

 

PRIMARY SURGEON:

Nanda Harley MD

 

ASSISTANT:

Domonique Donohue PA-C.

 

ANESTHESIA:

General.

 

ESTIMATED BLOOD LOSS:

5 mL.

 

TOURNIQUET TIME:

14 minutes.

 

COMPLICATIONS:

None.

 

DVT PROPHYLAXIS:

Not indicated.

 

IMPLANTS USED:

None.

 

BRIEF HISTORY:

Gallito is a 20-year-old female who has previously undergone an open MPFL

reconstruction.  She had done well until recently, when she developed recurrent

pain in her knee.  MRI was negative, other than some evidence of fat pad

impingement.  Due to her lack of response to conservative treatment, I did

recommend surgical intervention.  The risks and goals of the procedure were

discussed with the patient and were documented preoperatively.  She agreed to

proceed.

DESCRIPTION OF PROCEDURE:

The patient was properly identified and brought to the operating room.  She was

transferred from the OR cart and placed on the operating room table in supine

position.  General anesthesia was administered.  After adequate anesthesia was

obtained, a well-padded tourniquet was applied to the left lower extremity.  The

left lower extremity was then prepped in standard fashion using ChloraPrep

solution.  It was then sterilely draped.  A time-out was performed to ensure

correct site and procedure.  Preoperative antibiotics were given.  The surgical

site had been marked preoperatively.  An Esmarch was used to exsanguinate the

left lower extremity and the tourniquet was inflated to 250 mmHg.

 

A lateral portal arthrotomy was established.  Blunt trocar and cannula were

introduced into the suprapatellar pouch.  Camera, inflow, and outflow were

assembled.  No synovitis was noted.  The patellofemoral joint was visualized.

The patella showed some degenerative changes along the undersurface.  The

trochlea did not show any degenerative changes.  The patella appeared to track

centrally.  There was some impingement of the fat pad distal and lateral to the

patella with flexion and extension of the knee.  I then extended down the

lateral and medial gutter.  No loose bodies were identified.

 

I then entered the medial compartment.  A medial portal arthrotomy was

established.  A blunt probe was inserted.  The meniscus was extensively probed.

No tearing was noted.  The joint surfaces showed no degenerative changes.  I

then entered the notch.  Both the ACL and PCL were visualized and probed and

found to be intact.

 

Finally, I entered the lateral compartment.  She had a small area of grade 2 to

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

Chondroplasty of this area was performed.  The meniscus was probed and found to

be stable.

 

I then entered the patellofemoral joint again.  The shaver was used to resect

the hypertrophic fat pad.  At the completion, there was no further impingement

noted.

 

The instruments were then removed from the knee.  The portal sites were closed

with 3-0 nylon.  Lidocaine 1% was injected along the portal tracts.  Xeroform

gauze was placed over the wound and a bulky dressing was applied.  The

tourniquet was then deflated.  She was awakened from her anesthetic and

transferred back to the operating room cart.  She was brought to recovery room

in stable condition.  All needle and sponge counts were correct.

 

 

GILMAR / JOSUE

DD:  04/23/2018 08:48:33

DT:  04/23/2018 09:05:56

Job #:  [id number]
Bipolar 1 disorder, depressed

## 2024-10-15 NOTE — ANESTHESIA PREPROCEDURE EVALUATION
"Anesthesia Pre-Procedure Evaluation    Patient: Rosalina Osuna   MRN: [id number] : 1990        Procedure : Procedure(s):  Repeat  section with bilateral salpingectomy          History reviewed. No pertinent past medical history.   History reviewed. No pertinent surgical history.   No Known Allergies   Social History     Tobacco Use    Smoking status: Never    Smokeless tobacco: Never   Substance Use Topics    Alcohol use: Yes      Wt Readings from Last 1 Encounters:   22 55.6 kg (122 lb 9.6 oz)        Anesthesia Evaluation            ROS/MED HX  ENT/Pulmonary:     (+)                      asthma                  Neurologic:       Cardiovascular:       METS/Exercise Tolerance:     Hematologic:       Musculoskeletal:       GI/Hepatic:     (+) GERD,                   Renal/Genitourinary:       Endo:       Psychiatric/Substance Use:       Infectious Disease:       Malignancy:       Other:      (+)  , ,previous          Physical Exam    Airway        Mallampati: II       Respiratory Devices and Support         Dental           Cardiovascular   cardiovascular exam normal          Pulmonary   pulmonary exam normal                OUTSIDE LABS:  CBC:   Lab Results   Component Value Date    HGB 11.1 (L) 10/14/2024     BMP: No results found for: \"NA\", \"POTASSIUM\", \"CHLORIDE\", \"CO2\", \"BUN\", \"CR\", \"GLC\"  COAGS: No results found for: \"PTT\", \"INR\", \"FIBR\"  POC: No results found for: \"BGM\", \"HCG\", \"HCGS\"  HEPATIC:   Lab Results   Component Value Date    ALBUMIN 4.4 2016    PROTTOTAL 6.7 2016    ALT 17 2016    AST 19 2016    ALKPHOS 41 2016    BILITOTAL 0.6 2016    BILIDIRECT 0.16 2016     OTHER: No results found for: \"PH\", \"LACT\", \"A1C\", \"CAIO\", \"PHOS\", \"MAG\", \"LIPASE\", \"AMYLASE\", \"TSH\", \"T4\", \"T3\", \"CRP\", \"SED\"    Anesthesia Plan    ASA Status:  2    NPO Status:  NPO Appropriate    Anesthesia Type: Epidural, Spinal.              Consents    Anesthesia " Plan(s) and associated risks, benefits, and realistic alternatives discussed. Questions answered and patient/representative(s) expressed understanding.     - Discussed:     - Discussed with:  Patient            Postoperative Care    Pain management: IV analgesics, intrathecal morphine.        Comments:               GUANAKITO MOROCHO MD    I have reviewed the pertinent notes and labs in the chart from the past 30 days and (re)examined the patient.  Any updates or changes from those notes are reflected in this note.                           # Asthma: noted on problem list

## 2024-10-16 LAB — HGB BLD-MCNC: 10.6 G/DL (ref 11.7–15.7)

## 2024-10-16 PROCEDURE — 250N000013 HC RX MED GY IP 250 OP 250 PS 637: Performed by: OBSTETRICS & GYNECOLOGY

## 2024-10-16 PROCEDURE — 250N000011 HC RX IP 250 OP 636: Performed by: OBSTETRICS & GYNECOLOGY

## 2024-10-16 PROCEDURE — 36415 COLL VENOUS BLD VENIPUNCTURE: CPT | Performed by: OBSTETRICS & GYNECOLOGY

## 2024-10-16 PROCEDURE — 120N000012 HC R&B POSTPARTUM

## 2024-10-16 PROCEDURE — 85018 HEMOGLOBIN: CPT | Performed by: OBSTETRICS & GYNECOLOGY

## 2024-10-16 RX ADMIN — KETOROLAC TROMETHAMINE 30 MG: 30 INJECTION, SOLUTION INTRAMUSCULAR at 05:15

## 2024-10-16 RX ADMIN — ACETAMINOPHEN 975 MG: 325 TABLET, FILM COATED ORAL at 12:04

## 2024-10-16 RX ADMIN — FLUTICASONE PROPIONATE AND SALMETEROL 1 PUFF: 113; 14 POWDER, METERED RESPIRATORY (INHALATION) at 20:25

## 2024-10-16 RX ADMIN — SENNOSIDES AND DOCUSATE SODIUM 1 TABLET: 50; 8.6 TABLET ORAL at 10:28

## 2024-10-16 RX ADMIN — FLUTICASONE PROPIONATE AND SALMETEROL 1 PUFF: 113; 14 POWDER, METERED RESPIRATORY (INHALATION) at 10:28

## 2024-10-16 RX ADMIN — SENNOSIDES AND DOCUSATE SODIUM 1 TABLET: 50; 8.6 TABLET ORAL at 20:22

## 2024-10-16 RX ADMIN — ACETAMINOPHEN 975 MG: 325 TABLET, FILM COATED ORAL at 06:00

## 2024-10-16 RX ADMIN — ACETAMINOPHEN 975 MG: 325 TABLET, FILM COATED ORAL at 18:31

## 2024-10-16 RX ADMIN — IBUPROFEN 800 MG: 400 TABLET, FILM COATED ORAL at 16:41

## 2024-10-16 RX ADMIN — OXYCODONE HYDROCHLORIDE 5 MG: 5 TABLET ORAL at 20:22

## 2024-10-16 RX ADMIN — IBUPROFEN 800 MG: 400 TABLET, FILM COATED ORAL at 11:18

## 2024-10-16 ASSESSMENT — ACTIVITIES OF DAILY LIVING (ADL)
ADLS_ACUITY_SCORE: 22
ADLS_ACUITY_SCORE: 27
ADLS_ACUITY_SCORE: 22
ADLS_ACUITY_SCORE: 27
ADLS_ACUITY_SCORE: 27
ADLS_ACUITY_SCORE: 22
ADLS_ACUITY_SCORE: 27
ADLS_ACUITY_SCORE: 27
ADLS_ACUITY_SCORE: 22
ADLS_ACUITY_SCORE: 27
ADLS_ACUITY_SCORE: 22
ADLS_ACUITY_SCORE: 27

## 2024-10-16 NOTE — PROGRESS NOTES
[facility] Obstetrics Post-Op / Progress Note         Assessment and Plan:    Assessment:   Post-operative day #1  Low transverse repeat  section  Bilateral salpingectomy  L&D complications: none      Doing well.  No immediate surgical complications identified.  Pain well-controlled.        Plan:   Ambulation encouraged  Routine postpartum care  Weiss removed, voiding trial  Anticipate discharge tomorrow           Interval History:   C/o nausea /vomiting yesterday, now resolved. Tolerating regular diet. +flatus          Significant Problems:    None          Review of Systems:    The patient denies any chest pain, shortness of breath, excessive pain, fever, chills, purulent drainage from the wound, nausea or vomiting.          Medications:   All medications related to the patient's surgery have been reviewed  Current Facility-Administered Medications   Medication Dose Route Frequency Provider Last Rate Last Admin    acetaminophen (TYLENOL) tablet 975 mg  975 mg Oral Q6H Arabella Nolan MD   975 mg at 10/16/24 0600    [START ON 10/17/2024] bisacodyl (DULCOLAX) suppository 10 mg  10 mg Rectal Daily PRN Arabella Nolan MD        carboprost (HEMABATE) injection 250 mcg  250 mcg Intramuscular Q15 Min PRN Arabella Nolan MD        And    loperamide (IMODIUM) capsule 4 mg  4 mg Oral Once PRN Arabella Nolan MD        dextrose 5% in lactated ringers infusion   Intravenous Continuous Arabella Nolan MD   Stopped at 10/16/24 0523    fluticasone-salmeterol (AIRDUO RESPICLICK) 113-14 MCG/ACT inhaler 1 puff  1 puff Inhalation BID Arabella Nolan MD   1 puff at 10/15/24 2050    hydrocortisone (Perianal) (ANUSOL-HC) 2.5 % cream   Rectal TID PRN Arabella Nolan MD        HYDROmorphone (PF) (DILAUDID) injection 0.3-0.5 mg  0.3-0.5 mg Intravenous Q1H PRN Arabella Nolan MD        ibuprofen (ADVIL/MOTRIN) tablet 800 mg  800 mg Oral Q6H Ovidio  Arabella Bautista MD        lanolin cream   Topical Q1H PRN Arabella Nolan MD        lidocaine (LMX4) cream   Topical Q1H PRN Arabella Nolan MD        lidocaine 1 % 0.1-1 mL  0.1-1 mL Other Q1H PRN Arabella Nolan MD        loperamide (IMODIUM) capsule 2 mg  2 mg Oral Q2H PRN Arabella Nolan MD        methylergonovine (METHERGINE) injection 200 mcg  200 mcg Intramuscular Q2H PRN Arabella Nolan MD        metoclopramide (REGLAN) tablet 10 mg  10 mg Oral Q6H PRN Arabella Nolan MD        Or    metoclopramide (REGLAN) injection 10 mg  10 mg Intravenous Q6H PRN Arabella Nolan MD   10 mg at 10/15/24 1811    misoprostol (CYTOTEC) tablet 400 mcg  400 mcg Oral ONCE PRN REPEAT PER INSTRUCTIONS Arabella Nolan MD        Or    misoprostol (CYTOTEC) tablet 800 mcg  800 mcg Rectal ONCE PRN REPEAT PER INSTRUCTIONS Arabella Nolan MD        naloxone (NARCAN) injection 0.2 mg  0.2 mg Intravenous Q2 Min PRN Arabella Nolan MD        Or    naloxone (NARCAN) injection 0.4 mg  0.4 mg Intravenous Q2 Min PRN Arabella Nolan MD        Or    naloxone (NARCAN) injection 0.2 mg  0.2 mg Intramuscular Q2 Min PRN Arabella Nolan MD        Or    naloxone (NARCAN) injection 0.4 mg  0.4 mg Intramuscular Q2 Min PRN Arabella Nolan MD        No MMR Needed -  Assessment: Patient does not need MMR vaccine   Does not apply Continuous PRN Arabella Nolan MD        No Tdap Needed - Assessment: Patient does not need Tdap vaccine   Does not apply Continuous PRN Arabella Nolan MD        ondansetron (ZOFRAN ODT) ODT tab 4 mg  4 mg Oral Q6H PRN Arabella Nolan MD        Or    ondansetron (ZOFRAN) injection 4 mg  4 mg Intravenous Q6H PRN Arabella Nolan MD   4 mg at 10/15/24 1956    oxyCODONE (ROXICODONE) tablet 5 mg  5 mg Oral Q4H PRN Arabella Nolan MD        oxytocin (PITOCIN) 30 units in 500 mL 0.9% NaCl  infusion  340 mL/hr Intravenous Continuous PRN Arabella Nolan MD        oxytocin (PITOCIN) injection 10 Units  10 Units Intramuscular Once PRN Arabella Nolan MD        prochlorperazine (COMPAZINE) tablet 10 mg  10 mg Oral Q6H PRN Arabella Nolan MD        Or    prochlorperazine (COMPAZINE) injection 10 mg  10 mg Intravenous Q6H PRN Arabella Nolan MD        senna-docusate (SENOKOT-S/PERICOLACE) 8.6-50 MG per tablet 1 tablet  1 tablet Oral BID Arabella Nolan MD        Or    senna-docusate (SENOKOT-S/PERICOLACE) 8.6-50 MG per tablet 2 tablet  2 tablet Oral BID Arabella Nolan MD        simethicone (MYLICON) chewable tablet 80 mg  80 mg Oral 4x Daily PRN Arabella Nolan MD        sodium chloride (PF) 0.9% PF flush 3 mL  3 mL Intracatheter Q8H Arabella Nolan MD        sodium chloride (PF) 0.9% PF flush 3 mL  3 mL Intracatheter q1 min prn Arabella Nolan MD   3 mL at 10/16/24 0523    [START ON 10/17/2024] sodium phosphate (FLEET ENEMA) 1 enema  1 enema Rectal Daily PRN Arabella Nolan MD        tranexamic acid 1 g in 100 mL NS IV bag (premix)  1 g Intravenous Q30 Min PRN Arabella Nolan MD                 Physical Exam:   All vitals stable  Temp: 98.2  F (36.8  C) Temp src: Oral BP: 117/66 Pulse: 70   Resp: 18 SpO2: 100 % O2 Device: None (Room air)    Wound clean and dry with minimal or no drainage.  Surrounding skin with minimal erythema.          Data:   All laboratory data related to this surgery reviewed  Hemoglobin   Date Value Ref Range Status   10/14/2024 11.1 (L) 11.7 - 15.7 g/dL Final     No imaging studies have been ordered    Jennifer L. Schwab, MD

## 2024-10-16 NOTE — PLAN OF CARE
Assessment and vital signs within normal limits.  Patient is up independently, voiding without difficulty, pain well controlled with medications given as prescribed. Taking tylenol and ibuprofen.  Working on breastfeeding, , and  cares. Encouraged to continue to ambulate as able and void frequently.  Continue to monitor and notify MD as needed.

## 2024-10-16 NOTE — PROVIDER NOTIFICATION
10/15/24 2018   Provider Notification   Provider Name/Title Dr. Oliveira   Method of Notification Electronic Page   Request Evaluate-Remote   Notification Reason Status Update     Paged MD regarding ongoing nausea/vomiting and low urine output. Received orders for 1L LR bolus over 1 hr and to keep guevara in until the morning.

## 2024-10-16 NOTE — PLAN OF CARE
Vitals within defined limits.  dressing c/d/i. Ice applied intermittently. Fundus firm. Lochia scant. Using Tylenol & Toradol for incisional pain as well as uterine soreness with good relief. Pt nauseous & vomited x2 in the evening- reglan & zofran were given & aromatherapy was utilized w/ good effect. Due to emesis & low urine output, 1L LR bolus was ordered & administered. Abdominal binder on for comfort. Lung sounds clear. Bowel sounds normoactive, patient reports passing gas. Up SBA, ambulated free of dizziness. Weiss removed at 0615, due to void. Encouraging pt to drink fluids, poor PO intake d/t N/V. Breastfeeding on cue w/ little assist.

## 2024-10-17 VITALS
WEIGHT: 141.9 LBS | HEART RATE: 58 BPM | BODY MASS INDEX: 26.81 KG/M2 | TEMPERATURE: 97.8 F | SYSTOLIC BLOOD PRESSURE: 111 MMHG | RESPIRATION RATE: 16 BRPM | DIASTOLIC BLOOD PRESSURE: 63 MMHG | OXYGEN SATURATION: 98 %

## 2024-10-17 LAB
PATH REPORT.COMMENTS IMP SPEC: NORMAL
PATH REPORT.COMMENTS IMP SPEC: NORMAL
PATH REPORT.FINAL DX SPEC: NORMAL
PATH REPORT.GROSS SPEC: NORMAL
PATH REPORT.MICROSCOPIC SPEC OTHER STN: NORMAL
PATH REPORT.RELEVANT HX SPEC: NORMAL
PHOTO IMAGE: NORMAL

## 2024-10-17 PROCEDURE — 88302 TISSUE EXAM BY PATHOLOGIST: CPT | Mod: 26 | Performed by: PATHOLOGY

## 2024-10-17 PROCEDURE — 250N000013 HC RX MED GY IP 250 OP 250 PS 637: Performed by: OBSTETRICS & GYNECOLOGY

## 2024-10-17 RX ORDER — OXYCODONE HYDROCHLORIDE 5 MG/1
5 TABLET ORAL EVERY 4 HOURS PRN
Qty: 12 TABLET | Refills: 0 | Status: SHIPPED | OUTPATIENT
Start: 2024-10-17

## 2024-10-17 RX ADMIN — OXYCODONE HYDROCHLORIDE 5 MG: 5 TABLET ORAL at 13:48

## 2024-10-17 RX ADMIN — FLUTICASONE PROPIONATE AND SALMETEROL 1 PUFF: 113; 14 POWDER, METERED RESPIRATORY (INHALATION) at 08:27

## 2024-10-17 RX ADMIN — ACETAMINOPHEN 975 MG: 325 TABLET, FILM COATED ORAL at 02:13

## 2024-10-17 RX ADMIN — OXYCODONE HYDROCHLORIDE 5 MG: 5 TABLET ORAL at 09:04

## 2024-10-17 RX ADMIN — IBUPROFEN 800 MG: 400 TABLET, FILM COATED ORAL at 02:13

## 2024-10-17 RX ADMIN — IBUPROFEN 800 MG: 400 TABLET, FILM COATED ORAL at 13:48

## 2024-10-17 RX ADMIN — ACETAMINOPHEN 975 MG: 325 TABLET, FILM COATED ORAL at 08:23

## 2024-10-17 RX ADMIN — ACETAMINOPHEN 975 MG: 325 TABLET, FILM COATED ORAL at 13:48

## 2024-10-17 RX ADMIN — IBUPROFEN 800 MG: 400 TABLET, FILM COATED ORAL at 08:23

## 2024-10-17 RX ADMIN — SENNOSIDES AND DOCUSATE SODIUM 1 TABLET: 50; 8.6 TABLET ORAL at 08:23

## 2024-10-17 ASSESSMENT — ACTIVITIES OF DAILY LIVING (ADL)
ADLS_ACUITY_SCORE: 22

## 2024-10-17 NOTE — LACTATION NOTE
Initial and discharge visit with Mother and Father and baby girl.   Baby girl had been breast feeding well and frequently for the first day and a half of life.  She was also very oral.  Then, yesterday afternoon, she became frantic at the breast, then would get latched on, and fall asleep and was disinterested in feeding.  Since baby girl had not fed very well into last evening/night, baby girl was given a bottle with human donor milk during the night.  Baby girl is also using a pacifier.  Mother began pumping and she pumped 25 mls with her last pumping session!    Baby girl due for a feeding at time of visit.  LC reviewed with Mother proper positioning of baby, maternal hand placement, using breast feeding support pillows, and how to help baby achieve a deep latch with feedings.  Reviewed importance of getting a deep latch with feedings versus a shallow latch.  LC assisted mother to get baby latched onto left breast in the cross cradle and football position.  Baby girl was frantic and fussy when attempting to wake her for feeding.  When she was placed on Mother's breast she immediately fell asleep.  She was showing no interest in feeding and wouldn't open her mouth.  With assistance of LC pulling down on her chin and pushing her head forward, baby girl able to latch well with a deep latch.  She then would not suckle but just sit at the breast.  LC placed a tube and syringe into baby girl's mouth and pushed a few drops into baby's mouth.   This enticed baby to suckle a few sucks but then stopped again.  LC continued this multiple times.  After LC pushed in 1.5 mls, baby girl then began to suckle continuously.  She was able to pull 3.5 mls of human donor milk from the syringe.  The tube then fell out of baby's mouth but she continued to suckle vigorously for another 30 min!  Mother and Father are relieved and excited by this feeding.  Baby girl tolerates feeding well.        Mother and Father educated on normal   behavior, focusing on normal feeding patterns from birth to day 3 of life. Reviewed how to know baby is getting enough by recording feedings and wet/dirty diapers.  Breast feeding general information reviewed.  Reviewed with Mother and Father the breast feeding section in the admission booklet.  LC encouraged parents to record infant feedings, voids, and stools in a feeding log.    Appreciative of visit.  No further questions at this time.   Reviewed follow up with outpatient lactation consultant in pediatrician clinic as needed.    Kinza Belle RN, IBCLC

## 2024-10-17 NOTE — DISCHARGE INSTRUCTIONS
Warning Signs after Having a Baby    Keep this paper on your fridge or somewhere else where you can see it.    Call your provider if you have any of these symptoms up to 12 weeks after having your baby.    Thoughts of hurting yourself or your baby  Pain in your chest or trouble breathing  Severe headache not helped by pain medicine  Eyesight concerns (blurry vision, seeing spots or flashes of light, other changes to eyesight)  Fainting, shaking or other signs of a seizure    Call 9-1-1 if you feel that it is an emergency.     The symptoms below can happen to anyone after giving birth. They can be very serious. Call your provider if you have any of these warning signs.    My provider s phone number: _______________________    Losing too much blood (hemorrhage)    Call your provider if you soak through a pad in less than an hour or pass blood clots bigger than a golf ball. These may be signs that you are bleeding too much.    Blood clots in the legs or lungs    After you give birth, your body naturally clots its blood to help prevent blood loss. Sometimes this increased clotting can happen in other areas of the body, like the legs or lungs. This can block your blood flow and be very dangerous.     Call your provider if you:  Have a red, swollen spot on the back of your leg that is warm or painful when you touch it.   Are coughing up blood.     Infection    Call your provider if you have any of these symptoms:  Fever of 100.4 F (38 C) or higher.  Pain or redness around your stitches if you had an incision.   Any yellow, white, or green fluid coming from places where you had stitches or surgery.    Mood Problems (postpartum depression)    Many people feel sad or have mood changes after having a baby. But for some people, these mood swings are worse.     Call your provider right away if you feel so anxious or nervous that you can't care for yourself or your baby.    Preeclampsia (high blood pressure)    Even if you  didn't have high blood pressure when you were pregnant, you are at risk for the high blood pressure disease called preeclampsia. This risk can last up to 12 weeks after giving birth.     Call your provider if you have:   Pain on your right side under your rib cage  Sudden swelling in the hands and face    Remember: You know your body. If something doesn't feel right, get medical help.     For informational purposes only. Not to replace the advice of your health care provider. Copyright 2020 [facility]. All rights reserved. Clinically reviewed by Jenelle Valles, RNC-OB, MSN. [employer] 011126 - Rev 02/23.    Breastfeeding: Care Instructions  Breastfeeding (sometimes called chestfeeding) is a skill that gets easier with practice. Try to be patient with yourself and your baby. You're both learning how to breastfeed.    Breastfeeding has many benefits. It can help you bond with your baby. It may lower your baby's chances of getting an infection.   If you have trouble breastfeeding, talk to your doctor, midwife, or lactation consultant. Support can also come from a trusted friend or family member who knows how to breastfeed.         Eat a variety of foods.   Choose vegetables, fruits, milk products, whole grains, and proteins.        Try to limit or avoid certain things.   Limit alcohol. It can pass through breast milk to your baby.  Avoid smoking, vaping, marijuana, and other drugs.  Try to reduce caffeine if your baby is fussy and has problems with sleep.  Avoid fish high in mercury. These include shark, swordfish, mine mackerel, marlin, orange roughy, and bigeye tuna, as well as tilefish from the [place].        Talk to your doctor about medicines and supplements.   Some can affect your breast milk or your baby.        Try different breastfeeding positions.   Find what works for you and your baby.  Different holds include cradle, cross-cradle, football, Australian, laid back, and side-lying.       "  Take care of yourself.   Take steps to prevent painful or cracked nipples.  Make sure your baby feeds with a good latch.  Ask for help if you're having pain while breastfeeding.  Try letting some breast milk dry on your nipples.  Rest when you can, and drink plenty of water. And ask for help if you need it.  When should you call for help?   Call your doctor now or seek immediate medical care if:    You have symptoms of a breast infection, such as:  Increased pain, swelling, redness, or warmth around a breast.  Red streaks extending from the breast.  Pus draining from a breast.  A fever.     Your baby has no wet diapers for 6 hours.   Watch closely for changes in your health, and be sure to contact your doctor if:    Your baby has trouble latching on to your breast.     You continue to have pain or discomfort when breastfeeding.     You have other questions or concerns.   Where can you learn more?  Go to https://www.[employer].net/patiented  Enter P492 in the search box to learn more about \"Breastfeeding: Care Instructions.\"  Current as of: July 10, 2023  Content Version: 14.2024 [facility] [employer].   Care instructions adapted under license by your healthcare professional. If you have questions about a medical condition or this instruction, always ask your healthcare professional. [employer] disclaims any warranty or liability for your use of this information.  Postpartum Care at Home With Your Baby: Care Instructions  Overview     After childbirth (postpartum period), your body goes through many changes as you recover. In these weeks after delivery, try to take good care of yourself. Get rest whenever you can and accept help from others.  It may take 4 to 6 weeks to feel like yourself again, and possibly longer if you had a  birth. You may feel sore or very tired as you recover. After delivery, you may continue to have contractions as the uterus returns to the size it was before your " "pregnancy. You will also have some vaginal bleeding. And you may have pain around the vagina as you heal. Several days after delivery you may also have pain and swelling in your breasts as they fill with milk. There are things you can do at home to help ease these discomforts.  After childbirth, it's common to feel emotional. You may feel irritable, cry easily, and feel happy one minute and sad the next. This is called the \"baby blues.\" Hormone changes are one cause of these emotional changes. These feelings usually get better within a couple of weeks. If they don't, talk to your doctor or midwife.  In the first couple of weeks after you give birth, your doctor or midwife may want to check in with you and make a plan for follow-up care. You will likely have a complete postpartum visit in the first 3 months after delivery. At that time, your doctor or midwife will check on your recovery and see how you're doing. But if you have questions or concerns before then, you can always call your doctor or midwife.  Follow-up care is a key part of your treatment and safety. Be sure to make and go to all appointments, and call your doctor if you are having problems. It's also a good idea to know your test results and keep a list of the medicines you take.  How can you care for yourself at home?  Taking care of your body  Use pads instead of tampons for bleeding. After birth, you will have bloody vaginal discharge. You may also pass some blood clots that shouldn't be bigger than an egg. Over the next 6 weeks or so, your bleeding should decrease a little every day and slowly change to a pinkish and then whitish discharge.  For cramps or mild pain, try an over-the-counter pain medicine, such as acetaminophen (Tylenol) or ibuprofen (Advil, Motrin). Read and follow all instructions on the label.  To ease pain around the vagina or from hemorrhoids:  Put ice or a cold pack on the area for 10 to 20 minutes at a time. Put a thin cloth " between the ice and your skin.  Try sitting in a few inches of warm water (sitz bath) when you can or after bowel movements.  Clean yourself with a gentle squeeze of warm water from a bottle instead of wiping with toilet paper.  Use witch hazel or hemorrhoid pads (such as Tucks).  Try using a cold compress for sore and swollen breasts. And wear a supportive bra that fits.  Ease constipation by drinking plenty of fluids and eating high-fiber foods. Ask your doctor or midwife about over-the-counter stool softeners.  Activity  Rest when you can.  Ask for help from family or friends when you need it.  If you can, have another adult in your home for at least 2 or 3 days after birth.  When you feel ready, try to get some exercise every day. For many people, walking is a good choice. Don't do any heavy exercise until your doctor or midwife says it's okay.  Ask your doctor or midwife when it is okay to have vaginal sex.  If you don't want to get pregnant, talk to your doctor or midwife about birth control options. You can get pregnant even before your period returns. Also, you can get pregnant while you are breastfeeding.  Talk to your doctor or midwife if you want to get pregnant again. They can talk to you about when it is safe.  Emotional health  It's normal to have some sadness, anxiety, and mood swings after delivery. It may help to talk with a trusted friend or family member. You can also call the Maternal Mental Health Hotline at [facility] (1-542.234.2265) for support. If these mood changes last more than a couple of weeks, talk to your doctor or midwife.  When should you call for help?  Share this information with your partner, family, or a friend. They can help you watch for warning signs.  Call 821  anytime you think you may need emergency care. For example, call if:    You feel you cannot stop from hurting yourself, your baby, or someone else.     You passed out (lost consciousness).     You have chest pain,  are short of breath, or cough up blood.     You have a seizure.   Where to get help 24 hours a day, 7 days a week   If you or someone you know talks about suicide, self-harm, a mental health crisis, a substance use crisis, or any other kind of emotional distress, get help right away. You can:    Call the Suicide and Crisis Lifeline at 8.     Call 2-467-569-TALK (1-450.223.7667).     Text HOME to 919005 to access the Crisis Text Line.   Consider saving these numbers in your phone.  Go to [employer] for more information or to chat online.  Call your doctor or midwife now or seek immediate medical care if:    You have signs of hemorrhage (too much bleeding), such as:  Heavy vaginal bleeding. This means that you are soaking through one or more pads in an hour. Or you pass blood clots bigger than an egg.  Feeling dizzy or lightheaded, or you feel like you may faint.  Feeling so tired or weak that you cannot do your usual activities.  A fast or irregular heartbeat.  New or worse belly pain.     You have signs of infection, such as:  A fever.  Increased pain, swelling, warmth, or redness from an incision or wound.  Frequent or painful urination or blood in your urine.  Vaginal discharge that smells bad.  New or worse belly pain.     You have symptoms of a blood clot in your leg (called a deep vein thrombosis), such as:  Pain in the calf, back of the knee, thigh, or groin.  Swelling in the leg or groin.  A color change on the leg or groin. The skin may be reddish or purplish, depending on your usual skin color.     You have signs of preeclampsia, such as:  Sudden swelling of your face, hands, or feet.  New vision problems (such as dimness, blurring, or seeing spots).  A severe headache.     You have signs of heart failure, such as:  New or increased shortness of breath.  New or worse swelling in your legs, ankles, or feet.  Sudden weight gain, such as more than 2 to 3 pounds in a day or 5 pounds in a week.  Feeling  "so tired or weak that you cannot do your usual activities.     You had spinal or epidural pain relief and have:  New or worse back pain.  Increased pain, swelling, warmth, or redness at the injection site.  Tingling, weakness, or numbness in your legs or groin.   Watch closely for changes in your health, and be sure to contact your doctor or midwife if:    Your vaginal bleeding isn't decreasing.     You feel sad, anxious, or hopeless for more than a few days.     You are having problems with your breasts or breastfeeding.   Where can you learn more?  Go to https://www.[employer].net/patiented  Enter Z768 in the search box to learn more about \"Postpartum Care at Home With Your Baby: Care Instructions.\"  Current as of: July 10, 2023  Content Version: 14.2 2024 [facility] [employer].   Care instructions adapted under license by your healthcare professional. If you have questions about a medical condition or this instruction, always ask your healthcare professional. Healthwise, Incorporated disclaims any warranty or liability for your use of this information.    "

## 2024-10-17 NOTE — PLAN OF CARE
Goal Outcome Evaluation:      Plan of Care Reviewed With: patient    Overall Patient Progress: improvingOverall Patient Progress: improving    Vital signs stable and assessments WDL. Patient is up independently, voiding without difficulty, pain is controlled with tylenol, ibuprofen and oxycodone PRN. Encouraged to continue to ambulate as able and void frequently. Patient is bonding well with infant.  at bedside and attentive to patient and infant.

## 2024-10-17 NOTE — DISCHARGE SUMMARY
[facility] Discharge Summary    Rosalina Osuna MRN# [id number]   Age: 34 year old YOB: 1990     Date of Admission:  10/15/2024  Date of Discharge::  10/17/2024  Admitting Physician:  Arabella Nolan MD  Discharge Physician:  HAZEL KILPATRICK MD     Home clinic: [facility]          Admission Diagnoses:   Previous  section [Z98.891]  Admission for sterilization [Z30.2]          Discharge Diagnosis:     Previous  section [Z98.891]  Admission for sterilization [Z30.2]          Procedures:     Procedure(s): Repeat low transverse  section  Bilateral salpingectomy       No other procedures performed during this admission           Medications Prior to Admission:     Medications Prior to Admission   Medication Sig Dispense Refill Last Dose    albuterol (PROAIR HFA/PROVENTIL HFA/VENTOLIN HFA) 108 (90 Base) MCG/ACT inhaler Inhale 2 puffs into the lungs every 6 hours 18 g 3 Past Month    fluticasone-salmeterol (AIRDUO RESPICLICK) 113-14 MCG/ACT inhaler INHALE 1 PUFF BY MOUTH TWICE DAILY 3 each 3 10/15/2024 at 0430    LORazepam (ATIVAN) 0.5 MG tablet Take 1 tablet (0.5 mg) by mouth every 8 hours as needed for anxiety 20 tablet 0 More than a month    fluticasone (FLONASE) 50 MCG/ACT nasal spray Spray 1-2 sprays into both nostrils daily 1 Package 11     fluticasone-salmeterol (AIRDUO RESPICLICK) 113-14 MCG/ACT inhaler INHALE 1 PUFF BY MOUTH TWICE A DAY 3 each 3     fluticasone-salmeterol (AIRDUO RESPICLICK) 113-14 MCG/ACT inhaler Inhale 1 puff into the lungs 2 times daily 3 each 3              Discharge Medications:     Current Discharge Medication List        START taking these medications    Details   oxyCODONE (ROXICODONE) 5 MG tablet Take 1 tablet (5 mg) by mouth every 4 hours as needed for pain.  Qty: 12 tablet, Refills: 0    Associated Diagnoses:  delivery delivered           CONTINUE these medications which have NOT CHANGED    Details   albuterol (PROAIR  HFA/PROVENTIL HFA/VENTOLIN HFA) 108 (90 Base) MCG/ACT inhaler Inhale 2 puffs into the lungs every 6 hours  Qty: 18 g, Refills: 3    Comments: Pharmacy may dispense brand covered by insurance (Proair, or proventil or ventolin or generic albuterol inhaler)  Associated Diagnoses: Mild intermittent reactive airway disease without complication      !! fluticasone-salmeterol (AIRDUO RESPICLICK) 113-14 MCG/ACT inhaler INHALE 1 PUFF BY MOUTH TWICE DAILY  Qty: 3 each, Refills: 3    Comments: PATIENT IS REQUESTING REFILL  Associated Diagnoses: Mild intermittent asthma without complication      LORazepam (ATIVAN) 0.5 MG tablet Take 1 tablet (0.5 mg) by mouth every 8 hours as needed for anxiety  Qty: 20 tablet, Refills: 0    Associated Diagnoses: Anxiety      fluticasone (FLONASE) 50 MCG/ACT nasal spray Spray 1-2 sprays into both nostrils daily  Qty: 1 Package, Refills: 11    Associated Diagnoses: Chronic rhinitis      !! fluticasone-salmeterol (AIRDUO RESPICLICK) 113-14 MCG/ACT inhaler INHALE 1 PUFF BY MOUTH TWICE A DAY  Qty: 3 each, Refills: 3    Associated Diagnoses: Mild intermittent asthma without complication      !! fluticasone-salmeterol (AIRDUO RESPICLICK) 113-14 MCG/ACT inhaler Inhale 1 puff into the lungs 2 times daily  Qty: 3 each, Refills: 3    Associated Diagnoses: Mild intermittent asthma without complication       !! - Potential duplicate medications found. Please discuss with provider.                Consultations:   No consultations were requested during this admission          Brief History of Labor or Admission:   Presented for repeat C/S and BS. Underwent surgery without complications with delivery of female infant, 8#.           Hospital Course:   The patient's hospital course was unremarkable.  She recovered as anticipated and experienced no post-operative complications.  On discharge, her pain was well controlled. Vaginal bleeding is similar to peak menstrual flow.  Voiding without difficulty.   Ambulating well and tolerating a normal diet.  No fever or significant wound drainage.  Breastfeeding well.  Infant is stable.  No bowel movement yet.  She was discharged on post-partum day #2.    Post-partum hemoglobin:   Hemoglobin   Date Value Ref Range Status   10/16/2024 10.6 (L) 11.7 - 15.7 g/dL Final             Discharge Instructions and Follow-Up:     Discharge diet: Regular   Discharge activity: No lifting > 20 for 6 weeks  No driving for 2 weeks   Discharge follow-up: Follow up with Dr. Nolan in 2 and 6 weeks   Wound care: Steri-strips off in 7 days           Discharge Disposition:     Discharged to home      Attestation:  I have reviewed today's vital signs, notes, medications, labs and imaging.    HAZEL KILPATRICK MD

## 2025-03-08 ENCOUNTER — HEALTH MAINTENANCE LETTER (OUTPATIENT)
Age: 35
End: 2025-03-08

## 2025-03-22 DIAGNOSIS — J45.20 MILD INTERMITTENT ASTHMA WITHOUT COMPLICATION: ICD-10-CM

## 2025-03-24 RX ORDER — FLUTICASONE PROPIONATE AND SALMETEROL 113; 14 UG/1; UG/1
1 POWDER, METERED RESPIRATORY (INHALATION) 2 TIMES DAILY
Refills: 3 | OUTPATIENT
Start: 2025-03-24

## 2025-04-06 ENCOUNTER — TELEPHONE (OUTPATIENT)
Dept: FAMILY MEDICINE | Facility: CLINIC | Age: 35
End: 2025-04-06

## 2025-04-06 DIAGNOSIS — J45.20 MILD INTERMITTENT REACTIVE AIRWAY DISEASE WITHOUT COMPLICATION: Primary | ICD-10-CM

## 2025-04-06 RX ORDER — ALBUTEROL SULFATE 90 UG/1
2 INHALANT RESPIRATORY (INHALATION) EVERY 6 HOURS
Qty: 18 G | Refills: 3 | Status: SHIPPED | OUTPATIENT
Start: 2025-04-06

## (undated) DEVICE — BLADE CLIPPER 4406

## (undated) DEVICE — SUCTION CANISTER MEDIVAC LINER 3000ML W/LID 65651-530

## (undated) DEVICE — SU VICRYL 0 CT-1 27" J340H

## (undated) DEVICE — GLOVE PROTEXIS W/NEU-THERA 6.5  2D73TE65

## (undated) DEVICE — SU MONOCRYL 0 CTX 36" Y398H

## (undated) DEVICE — SU VICRYL 3-0 CT-1 36" J338H

## (undated) DEVICE — ESU GROUND PAD UNIVERSAL W/O CORD

## (undated) DEVICE — PACK C-SECTION LF PL15OTA83B

## (undated) DEVICE — GLOVE PROTEXIS BLUE W/NEU-THERA 6.5  2D73EB65

## (undated) DEVICE — SOL NACL 0.9% IRRIG 1000ML BOTTLE 07138-09

## (undated) DEVICE — SU VICRYL 0 CT 36" J358H

## (undated) DEVICE — LINEN C-SECTION 5415

## (undated) DEVICE — CATH TRAY FOLEY 16FR BARDEX W/DRAIN BAG STATLOCK 300316A

## (undated) DEVICE — PREP CHLORAPREP 26ML TINTED HI-LITE ORANGE 930815

## (undated) DEVICE — SURGICEL POWDER ABSORBABLE HEMOSTAT 3GM 3013SP

## (undated) RX ORDER — KETOROLAC TROMETHAMINE 30 MG/ML
INJECTION, SOLUTION INTRAMUSCULAR; INTRAVENOUS
Status: DISPENSED
Start: 2024-10-15

## (undated) RX ORDER — ONDANSETRON 2 MG/ML
INJECTION INTRAMUSCULAR; INTRAVENOUS
Status: DISPENSED
Start: 2024-10-15

## (undated) RX ORDER — MORPHINE SULFATE 1 MG/ML
INJECTION, SOLUTION EPIDURAL; INTRATHECAL; INTRAVENOUS
Status: DISPENSED
Start: 2024-10-15

## (undated) RX ORDER — OXYTOCIN/0.9 % SODIUM CHLORIDE 30/500 ML
PLASTIC BAG, INJECTION (ML) INTRAVENOUS
Status: DISPENSED
Start: 2024-10-15